# Patient Record
Sex: FEMALE | Race: WHITE | NOT HISPANIC OR LATINO | Employment: UNEMPLOYED | URBAN - NONMETROPOLITAN AREA
[De-identification: names, ages, dates, MRNs, and addresses within clinical notes are randomized per-mention and may not be internally consistent; named-entity substitution may affect disease eponyms.]

---
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
## 2022-11-29 ENCOUNTER — HOSPITAL ENCOUNTER (EMERGENCY)
Facility: HOSPITAL | Age: 22
Discharge: HOME | End: 2022-11-29
Attending: STUDENT IN AN ORGANIZED HEALTH CARE EDUCATION/TRAINING PROGRAM | Admitting: STUDENT IN AN ORGANIZED HEALTH CARE EDUCATION/TRAINING PROGRAM

## 2022-11-29 VITALS
DIASTOLIC BLOOD PRESSURE: 78 MMHG | OXYGEN SATURATION: 99 % | BODY MASS INDEX: 35.88 KG/M2 | RESPIRATION RATE: 16 BRPM | SYSTOLIC BLOOD PRESSURE: 124 MMHG | WEIGHT: 178 LBS | HEART RATE: 88 BPM | HEIGHT: 59 IN | TEMPERATURE: 98.7 F

## 2022-11-29 DIAGNOSIS — J10.1 INFLUENZA A: Primary | ICD-10-CM

## 2022-11-29 LAB
FLUAV SUBTYP SPEC NAA+PROBE: DETECTED
FLUBV RNA ISLT QL NAA+PROBE: NOT DETECTED
SARS-COV-2 RNA PNL SPEC NAA+PROBE: NOT DETECTED

## 2022-11-29 PROCEDURE — 87636 SARSCOV2 & INF A&B AMP PRB: CPT | Performed by: PHYSICIAN ASSISTANT

## 2022-11-29 PROCEDURE — 99283 EMERGENCY DEPT VISIT LOW MDM: CPT

## 2022-11-29 PROCEDURE — C9803 HOPD COVID-19 SPEC COLLECT: HCPCS

## 2022-11-29 RX ORDER — FEXOFENADINE HCL AND PSEUDOEPHEDRINE HCI 180; 240 MG/1; MG/1
1 TABLET, EXTENDED RELEASE ORAL DAILY
Qty: 30 TABLET | Refills: 0 | Status: SHIPPED | OUTPATIENT
Start: 2022-11-29

## 2022-12-05 NOTE — ED PROVIDER NOTES
Subjective   History of Present Illness  Patient has bilateral ear pain and sore throat cough and congestion.  Patient also has a headache and myalgias.  No n/v/d.    History provided by:  Patient   used: No    Flu Symptoms  Presenting symptoms: myalgias    Presenting symptoms: no cough, no diarrhea, no fever, no headaches, no nausea, no shortness of breath, no sore throat and no vomiting    Severity:  Mild  Onset quality:  Sudden  Progression:  Worsening  Chronicity:  New  Relieved by:  Nothing  Worsened by:  Nothing  Ineffective treatments:  None tried  Associated symptoms: ear pain    Associated symptoms: no chills and no congestion    Risk factors: sick contacts        Review of Systems   Constitutional: Positive for activity change. Negative for chills and fever.   HENT: Positive for ear pain. Negative for congestion and sore throat.    Respiratory: Negative for cough, shortness of breath and wheezing.    Cardiovascular: Negative for chest pain.   Gastrointestinal: Negative for diarrhea, nausea and vomiting.   Genitourinary: Negative for dysuria and flank pain.   Musculoskeletal: Positive for myalgias.   Skin: Negative for rash.   Neurological: Negative for headaches.   Psychiatric/Behavioral: The patient is not nervous/anxious.    All other systems reviewed and are negative.      Past Medical History:   Diagnosis Date   • ADHD (attention deficit hyperactivity disorder)    • Anxiety    • Urinary tract infection        Allergies   Allergen Reactions   • Biaxin [Clarithromycin] Rash       Past Surgical History:   Procedure Laterality Date   • CHOLECYSTECTOMY N/A 2/20/2020    Procedure: CHOLECYSTECTOMY LAPAROSCOPIC;  Surgeon: Leif Pinedo MD;  [facility];  Service: General;  Laterality: N/A;       Family History   Problem Relation Age of Onset   • Stroke Paternal Grandfather        Social History     Socioeconomic History   • Marital status: Single   Tobacco Use   • Smoking status:  Never   • Smokeless tobacco: Never   Substance and Sexual Activity   • Alcohol use: Never   • Drug use: Never   • Sexual activity: Yes     Partners: Male           Objective   Physical Exam  Vitals and nursing note reviewed.   Constitutional:       Appearance: She is well-developed.   HENT:      Head: Normocephalic.   Cardiovascular:      Rate and Rhythm: Normal rate and regular rhythm.   Pulmonary:      Effort: Pulmonary effort is normal.      Breath sounds: Normal breath sounds.   Abdominal:      General: Bowel sounds are normal.      Palpations: Abdomen is soft.      Tenderness: There is no abdominal tenderness.   Musculoskeletal:         General: Normal range of motion.      Cervical back: Neck supple.   Skin:     General: Skin is warm and dry.   Neurological:      Mental Status: She is alert and oriented to person, place, and time.   Psychiatric:         Behavior: Behavior normal.         Thought Content: Thought content normal.         Judgment: Judgment normal.         Procedures           ED Course                                           MDM    Final diagnoses:   Influenza A       ED Disposition  ED Disposition     ED Disposition   Discharge    Condition   Stable    Comment   --             Tonny Dunne MD  [address] [place] [address] [address] [place] [address] [phone]    In 2 days           Medication List      New Prescriptions    fexofenadine-pseudoephedrine 180-240 MG per 24 hr tablet  Commonly known as: Allegra-D Allergy & Congestion  Take 1 tablet by mouth Daily.        Stop    ibuprofen 800 MG tablet  Commonly known as: ADVIL,MOTRIN     lactulose 10 GM/15ML solution  Commonly known as: CHRONULAC           Where to Get Your Medications      These medications were sent to [id number] - [place] - [address] AT [facility] [address] - [phone] PH - [phone] FX  [address] [place] [address]    Phone: [phone]    · fexofenadine-pseudoephedrine 180-240 MG per 24 hr tablet          Mary Jane Gee PA  12/07/22 1010

## 2023-01-24 ENCOUNTER — APPOINTMENT (OUTPATIENT)
Dept: GENERAL RADIOLOGY | Facility: HOSPITAL | Age: 23
End: 2023-01-24
Payer: COMMERCIAL

## 2023-01-24 ENCOUNTER — HOSPITAL ENCOUNTER (EMERGENCY)
Facility: HOSPITAL | Age: 23
Discharge: HOME | End: 2023-01-24
Attending: STUDENT IN AN ORGANIZED HEALTH CARE EDUCATION/TRAINING PROGRAM | Admitting: STUDENT IN AN ORGANIZED HEALTH CARE EDUCATION/TRAINING PROGRAM
Payer: COMMERCIAL

## 2023-01-24 VITALS
WEIGHT: 166 LBS | HEIGHT: 60 IN | HEART RATE: 80 BPM | OXYGEN SATURATION: 99 % | DIASTOLIC BLOOD PRESSURE: 78 MMHG | BODY MASS INDEX: 32.59 KG/M2 | RESPIRATION RATE: 20 BRPM | SYSTOLIC BLOOD PRESSURE: 109 MMHG | TEMPERATURE: 97.8 F

## 2023-01-24 DIAGNOSIS — S39.012A STRAIN OF LUMBAR REGION, INITIAL ENCOUNTER: Primary | ICD-10-CM

## 2023-01-24 PROCEDURE — 25010000002 DEXAMETHASONE PER 1 MG: Performed by: STUDENT IN AN ORGANIZED HEALTH CARE EDUCATION/TRAINING PROGRAM

## 2023-01-24 PROCEDURE — 96372 THER/PROPH/DIAG INJ SC/IM: CPT

## 2023-01-24 PROCEDURE — 99283 EMERGENCY DEPT VISIT LOW MDM: CPT

## 2023-01-24 PROCEDURE — 72110 X-RAY EXAM L-2 SPINE 4/>VWS: CPT

## 2023-01-24 PROCEDURE — 25010000002 KETOROLAC TROMETHAMINE PER 15 MG: Performed by: STUDENT IN AN ORGANIZED HEALTH CARE EDUCATION/TRAINING PROGRAM

## 2023-01-24 RX ORDER — DEXAMETHASONE SODIUM PHOSPHATE 4 MG/ML
4 INJECTION, SOLUTION INTRA-ARTICULAR; INTRALESIONAL; INTRAMUSCULAR; INTRAVENOUS; SOFT TISSUE ONCE
Status: COMPLETED | OUTPATIENT
Start: 2023-01-24 | End: 2023-01-24

## 2023-01-24 RX ORDER — KETOROLAC TROMETHAMINE 30 MG/ML
30 INJECTION, SOLUTION INTRAMUSCULAR; INTRAVENOUS ONCE
Status: COMPLETED | OUTPATIENT
Start: 2023-01-24 | End: 2023-01-24

## 2023-01-24 RX ADMIN — DEXAMETHASONE SODIUM PHOSPHATE 4 MG: 4 INJECTION, SOLUTION INTRA-ARTICULAR; INTRALESIONAL; INTRAMUSCULAR; INTRAVENOUS; SOFT TISSUE at 04:00

## 2023-01-24 RX ADMIN — KETOROLAC TROMETHAMINE 30 MG: 30 INJECTION, SOLUTION INTRAMUSCULAR; INTRAVENOUS at 04:03

## 2023-01-24 NOTE — ED PROVIDER NOTES
Subjective   History of Present Illness  22-year-old female presents the ER with primary complaint of lumbar back pain.  Symptoms present for the last 1 to 2 days.  Patient notes she had possibly strained her back while cleaning.  Denied bowel or bladder incontinence.  Denied saddle anesthesia.  Lower back pain appears to radiate into the left lower back region.  No changes in bowel or urinary habits.  Patient does have a history of constipation.  No increasing nausea or vomiting.  No fever or chills.  No hematuria or dysuria.  Vitals stable        Review of Systems   Musculoskeletal: Positive for back pain.   All other systems reviewed and are negative.      Past Medical History:   Diagnosis Date   • ADHD (attention deficit hyperactivity disorder)    • Anxiety    • Urinary tract infection        Allergies   Allergen Reactions   • Biaxin [Clarithromycin] Rash       Past Surgical History:   Procedure Laterality Date   • CHOLECYSTECTOMY N/A 2/20/2020    Procedure: CHOLECYSTECTOMY LAPAROSCOPIC;  Surgeon: Leif Pinedo MD;  Location: [facility];  Service: General;  Laterality: N/A;       Family History   Problem Relation Age of Onset   • Stroke Paternal Grandfather        Social History     Socioeconomic History   • Marital status: Single   Tobacco Use   • Smoking status: Never   • Smokeless tobacco: Never   Substance and Sexual Activity   • Alcohol use: Never   • Drug use: Never   • Sexual activity: Yes     Partners: Male           Objective   Physical Exam  Constitutional:       General: She is not in acute distress.     Appearance: Normal appearance. She is not ill-appearing.   HENT:      Head: Normocephalic and atraumatic.      Right Ear: External ear normal.      Left Ear: External ear normal.      Nose: Nose normal.      Mouth/Throat:      Mouth: Mucous membranes are moist.   Eyes:      Extraocular Movements: Extraocular movements intact.      Pupils: Pupils are equal, round, and reactive to light.    Cardiovascular:      Rate and Rhythm: Normal rate and regular rhythm.      Heart sounds: No murmur heard.  Pulmonary:      Effort: Pulmonary effort is normal. No respiratory distress.      Breath sounds: Normal breath sounds. No wheezing.   Abdominal:      General: Bowel sounds are normal.      Palpations: Abdomen is soft.      Tenderness: There is no abdominal tenderness.   Musculoskeletal:         General: No deformity or signs of injury. Normal range of motion.      Cervical back: Normal range of motion and neck supple.   Skin:     General: Skin is warm and dry.      Findings: No erythema.   Neurological:      General: No focal deficit present.      Mental Status: She is alert and oriented to person, place, and time. Mental status is at baseline.      Cranial Nerves: No cranial nerve deficit.   Psychiatric:         Mood and Affect: Mood normal.         Behavior: Behavior normal.         Thought Content: Thought content normal.         Procedures           ED Course                                           Medical Decision Making  Lumbar imaging noted no acute abnormality.  Lumbar strain likely.  Toradol and Decadron given for symptom management.  No neurological red flags.  Work up and results were discussed throughly with the patient.  The patient will be discharged for further monitoring and management with their PCP.  Red flags, warning signs, worsening symptoms, and when to return to the ER discussed with and understood by the patient.  Patient will follow up with their PCP in a timely manner.  Vitals stable at discharge.    Strain of lumbar region, initial encounter: complicated acute illness or injury  Amount and/or Complexity of Data Reviewed  Radiology: ordered.      Risk  Prescription drug management.          Final diagnoses:   Strain of lumbar region, initial encounter       ED Disposition  ED Disposition     ED Disposition   Discharge    Condition   Stable    Comment   --             Vibha  Tonny Saunders MD  [address] [place] [address] [address] [place] [address]  [phone]    In 1 week      [facility] Emergency Department  [address] [place] [id number] [phone]    If symptoms worsen         Medication List      No changes were made to your prescriptions during this visit.          Alex Ramirez,   01/24/23 0350

## 2023-03-14 ENCOUNTER — HOSPITAL ENCOUNTER (EMERGENCY)
Facility: HOSPITAL | Age: 23
Discharge: HOME | End: 2023-03-14
Attending: STUDENT IN AN ORGANIZED HEALTH CARE EDUCATION/TRAINING PROGRAM | Admitting: STUDENT IN AN ORGANIZED HEALTH CARE EDUCATION/TRAINING PROGRAM
Payer: COMMERCIAL

## 2023-03-14 VITALS
OXYGEN SATURATION: 98 % | HEART RATE: 96 BPM | RESPIRATION RATE: 18 BRPM | HEIGHT: 60 IN | TEMPERATURE: 97.5 F | BODY MASS INDEX: 33.38 KG/M2 | DIASTOLIC BLOOD PRESSURE: 62 MMHG | SYSTOLIC BLOOD PRESSURE: 124 MMHG | WEIGHT: 170 LBS

## 2023-03-14 DIAGNOSIS — A08.4 VIRAL GASTROENTERITIS: Primary | ICD-10-CM

## 2023-03-14 LAB
ALBUMIN SERPL-MCNC: 4.2 G/DL (ref 3.5–5.2)
ALBUMIN/GLOB SERPL: 1.2 G/DL
ALP SERPL-CCNC: 74 U/L (ref 39–117)
ALT SERPL W P-5'-P-CCNC: 8 U/L (ref 1–33)
ANION GAP SERPL CALCULATED.3IONS-SCNC: 10.8 MMOL/L (ref 5–15)
AST SERPL-CCNC: 16 U/L (ref 1–32)
BACTERIA UR QL AUTO: ABNORMAL /HPF
BASOPHILS # BLD AUTO: 0.01 10*3/MM3 (ref 0–0.2)
BASOPHILS NFR BLD AUTO: 0.2 % (ref 0–1.5)
BILIRUB SERPL-MCNC: 0.5 MG/DL (ref 0–1.2)
BILIRUB UR QL STRIP: ABNORMAL
BUN SERPL-MCNC: 7 MG/DL (ref 6–20)
BUN/CREAT SERPL: 10.4 (ref 7–25)
CALCIUM SPEC-SCNC: 9.6 MG/DL (ref 8.6–10.5)
CHLORIDE SERPL-SCNC: 105 MMOL/L (ref 98–107)
CLARITY UR: CLEAR
CO2 SERPL-SCNC: 25.2 MMOL/L (ref 22–29)
COLOR UR: ABNORMAL
CREAT SERPL-MCNC: 0.67 MG/DL (ref 0.57–1)
CRP SERPL-MCNC: 1.98 MG/DL (ref 0–0.5)
DEPRECATED RDW RBC AUTO: 45.7 FL (ref 37–54)
EGFRCR SERPLBLD CKD-EPI 2021: 127.7 ML/MIN/1.73
EOSINOPHIL # BLD AUTO: 0.05 10*3/MM3 (ref 0–0.4)
EOSINOPHIL NFR BLD AUTO: 1.2 % (ref 0.3–6.2)
ERYTHROCYTE [DISTWIDTH] IN BLOOD BY AUTOMATED COUNT: 13.7 % (ref 12.3–15.4)
ERYTHROCYTE [SEDIMENTATION RATE] IN BLOOD: 25 MM/HR (ref 0–20)
GLOBULIN UR ELPH-MCNC: 3.6 GM/DL
GLUCOSE SERPL-MCNC: 98 MG/DL (ref 65–99)
GLUCOSE UR STRIP-MCNC: NEGATIVE MG/DL
HCG SERPL QL: NEGATIVE
HCT VFR BLD AUTO: 43.5 % (ref 34–46.6)
HGB BLD-MCNC: 14 G/DL (ref 12–15.9)
HGB UR QL STRIP.AUTO: ABNORMAL
HOLD SPECIMEN: NORMAL
HYALINE CASTS UR QL AUTO: ABNORMAL /LPF
IMM GRANULOCYTES # BLD AUTO: 0.01 10*3/MM3 (ref 0–0.05)
IMM GRANULOCYTES NFR BLD AUTO: 0.2 % (ref 0–0.5)
KETONES UR QL STRIP: ABNORMAL
LEUKOCYTE ESTERASE UR QL STRIP.AUTO: ABNORMAL
LIPASE SERPL-CCNC: 15 U/L (ref 13–60)
LYMPHOCYTES # BLD AUTO: 1.03 10*3/MM3 (ref 0.7–3.1)
LYMPHOCYTES NFR BLD AUTO: 24 % (ref 19.6–45.3)
MCH RBC QN AUTO: 29.1 PG (ref 26.6–33)
MCHC RBC AUTO-ENTMCNC: 32.2 G/DL (ref 31.5–35.7)
MCV RBC AUTO: 90.4 FL (ref 79–97)
MONOCYTES # BLD AUTO: 0.33 10*3/MM3 (ref 0.1–0.9)
MONOCYTES NFR BLD AUTO: 7.7 % (ref 5–12)
NEUTROPHILS NFR BLD AUTO: 2.86 10*3/MM3 (ref 1.7–7)
NEUTROPHILS NFR BLD AUTO: 66.7 % (ref 42.7–76)
NITRITE UR QL STRIP: NEGATIVE
NRBC BLD AUTO-RTO: 0 /100 WBC (ref 0–0.2)
PH UR STRIP.AUTO: 5.5 [PH] (ref 5–8)
PLATELET # BLD AUTO: 323 10*3/MM3 (ref 140–450)
PMV BLD AUTO: 9.9 FL (ref 6–12)
POTASSIUM SERPL-SCNC: 3.1 MMOL/L (ref 3.5–5.2)
PROT SERPL-MCNC: 7.8 G/DL (ref 6–8.5)
PROT UR QL STRIP: ABNORMAL
RBC # BLD AUTO: 4.81 10*6/MM3 (ref 3.77–5.28)
RBC # UR STRIP: ABNORMAL /HPF
REF LAB TEST METHOD: ABNORMAL
SODIUM SERPL-SCNC: 141 MMOL/L (ref 136–145)
SP GR UR STRIP: >1.03 (ref 1–1.03)
SQUAMOUS #/AREA URNS HPF: ABNORMAL /HPF
UROBILINOGEN UR QL STRIP: ABNORMAL
WBC # UR STRIP: ABNORMAL /HPF
WBC NRBC COR # BLD: 4.29 10*3/MM3 (ref 3.4–10.8)
WHOLE BLOOD HOLD COAG: NORMAL
WHOLE BLOOD HOLD SPECIMEN: NORMAL

## 2023-03-14 PROCEDURE — 84703 CHORIONIC GONADOTROPIN ASSAY: CPT | Performed by: PHYSICIAN ASSISTANT

## 2023-03-14 PROCEDURE — 99283 EMERGENCY DEPT VISIT LOW MDM: CPT

## 2023-03-14 PROCEDURE — 85652 RBC SED RATE AUTOMATED: CPT | Performed by: PHYSICIAN ASSISTANT

## 2023-03-14 PROCEDURE — 83690 ASSAY OF LIPASE: CPT | Performed by: PHYSICIAN ASSISTANT

## 2023-03-14 PROCEDURE — 87086 URINE CULTURE/COLONY COUNT: CPT | Performed by: PHYSICIAN ASSISTANT

## 2023-03-14 PROCEDURE — 85025 COMPLETE CBC W/AUTO DIFF WBC: CPT | Performed by: PHYSICIAN ASSISTANT

## 2023-03-14 PROCEDURE — 81001 URINALYSIS AUTO W/SCOPE: CPT | Performed by: PHYSICIAN ASSISTANT

## 2023-03-14 PROCEDURE — 36415 COLL VENOUS BLD VENIPUNCTURE: CPT

## 2023-03-14 PROCEDURE — 86140 C-REACTIVE PROTEIN: CPT | Performed by: PHYSICIAN ASSISTANT

## 2023-03-14 PROCEDURE — 80053 COMPREHEN METABOLIC PANEL: CPT | Performed by: PHYSICIAN ASSISTANT

## 2023-03-14 RX ORDER — ONDANSETRON 4 MG/1
4 TABLET, FILM COATED ORAL EVERY 6 HOURS
Qty: 12 TABLET | Refills: 0 | Status: SHIPPED | OUTPATIENT
Start: 2023-03-14 | End: 2023-03-14 | Stop reason: SDUPTHER

## 2023-03-14 RX ORDER — LOPERAMIDE HYDROCHLORIDE 2 MG/1
2 CAPSULE ORAL ONCE AS NEEDED
Status: COMPLETED | OUTPATIENT
Start: 2023-03-14 | End: 2023-03-14

## 2023-03-14 RX ORDER — ONDANSETRON 4 MG/1
4 TABLET, FILM COATED ORAL EVERY 8 HOURS PRN
Qty: 12 TABLET | Refills: 0 | Status: SHIPPED | OUTPATIENT
Start: 2023-03-14

## 2023-03-14 RX ADMIN — LOPERAMIDE HYDROCHLORIDE 2 MG: 2 CAPSULE ORAL at 22:11

## 2023-03-14 NOTE — ED NOTES
MEDICAL SCREENING:    Reason for Visit: Nausea, vomiting, diarrhea.  Started yesterday.  Daughter being seen for the same  Patient initially seen in triage.  The patient was advised further evaluation and diagnostic testing will be needed, some of the treatment and testing will be initiated in the lobby in order to begin the process.  The patient will be returned to the waiting area for the time being and possibly be re-assessed by a subsequent ED provider.  The patient will be brought back to the treatment area in as timely manner as possible.         Renny Corona PA  03/14/23 1801

## 2023-03-15 LAB — BACTERIA SPEC AEROBE CULT: NO GROWTH

## 2023-03-15 NOTE — DISCHARGE INSTRUCTIONS
Dr. Sasha Macdonald at bedside speaking to patient and family regarding TPA.       Yuriy Elder RN  02/20/21 2987 Zofran prescribed for nausea.  May take loperamide dose after next stool.  Expect symptoms will improve in the next few days, follow-up with your primary care provider in the next available appointment.

## 2023-03-15 NOTE — ED PROVIDER NOTES
[facility]  emergency department encounter        Pt Name: Edita Tellez  MRN: [id number]  Birthdate 2000  Date of evaluation: 3/14/2023    Chief Complaint   Patient presents with   • Diarrhea   • Vomiting             HISTORY OF PRESENT ILLNESS:   Edita Tellez is a 23 y.o. female previously healthy presents for nausea vomiting diarrhea new onset yesterday.  Daughter has similar symptoms preceding hers.  Patient has taken Imodium without significant relief.  Has not had nausea medications at home.  By time of physician interview patient denies active nausea and last 2 was prior to arrival.  Endorses feeling alternating hot and cold but no known fever.  Broadly denies symptoms otherwise including abdominal pain.            PCP: Tonny Dunne MD          REVIEW OF SYSTEMS:     Review of Systems   Constitutional: Positive for chills. Negative for fever.   HENT: Negative for congestion and rhinorrhea.    Eyes: Negative for visual disturbance.   Respiratory: Negative for cough and shortness of breath.    Cardiovascular: Negative for chest pain.   Gastrointestinal: Positive for diarrhea, nausea and vomiting. Negative for abdominal pain.   Genitourinary: Negative for dysuria.   Musculoskeletal: Negative for myalgias.   Skin: Negative for rash.   Neurological: Negative for headaches.   Psychiatric/Behavioral: Negative for confusion.       Review of systems otherwise as per HPI.          PREVIOUS HISTORY:         Past Medical History:   Diagnosis Date   • ADHD (attention deficit hyperactivity disorder)    • Anxiety    • Urinary tract infection            Past Surgical History:   Procedure Laterality Date   • CHOLECYSTECTOMY N/A 2/20/2020    Procedure: CHOLECYSTECTOMY LAPAROSCOPIC;  Surgeon: Leif Pinedo MD;  Location: [facility];  Service: General;  Laterality: N/A;           Social History     Socioeconomic History   • Marital status: Single   Tobacco Use   • Smoking status: Never   •  "Smokeless tobacco: Never   Substance and Sexual Activity   • Alcohol use: Never   • Drug use: Never   • Sexual activity: Yes     Partners: Male           Family History   Problem Relation Age of Onset   • Stroke Paternal Grandfather          Current Outpatient Medications   Medication Instructions   • fexofenadine-pseudoephedrine (Allegra-D Allergy & Congestion) 180-240 MG per 24 hr tablet 1 tablet, Oral, Daily   • ondansetron (ZOFRAN) 4 mg, Oral, Every 8 Hours PRN   • prenatal vitamin (prenatal, CLASSIC, vitamin) tablet 1 tablet, Oral, Daily         Allergies:  Biaxin [clarithromycin]          PHYSICAL EXAM:     Patient Vitals for the past 24 hrs:   BP Temp Pulse Resp SpO2 Height Weight   03/14/23 1802 121/52 97.3 °F (36.3 °C) 95 18 97 % 152.4 cm (60\") 77.1 kg (170 lb)       Physical Exam  Vitals and nursing note reviewed.   Constitutional:       General: She is not in acute distress.  HENT:      Head: Normocephalic and atraumatic.   Eyes:      Extraocular Movements: Extraocular movements intact.      Conjunctiva/sclera: Conjunctivae normal.   Pulmonary:      Effort: Pulmonary effort is normal. No respiratory distress.   Abdominal:      General: Abdomen is flat. There is no distension.   Musculoskeletal:         General: No deformity. Normal range of motion.      Cervical back: Normal range of motion. No rigidity.   Skin:     Coloration: Skin is not jaundiced.      Findings: No rash.   Neurological:      General: No focal deficit present.      Mental Status: She is alert and oriented to person, place, and time.   Psychiatric:         Mood and Affect: Mood normal.         Behavior: Behavior normal.             COMPLETED DIAGNOSTIC STUDIES AND INTERVENTIONS:     Lab Results (last 24 hours)     Procedure Component Value Units Date/Time    CBC & Differential [[id number]]  (Normal) Collected: 03/14/23 1913    Specimen: Blood from Arm, Right Updated: 03/14/23 1918    Narrative:      The following orders were created for " panel order CBC & Differential.  Procedure                               Abnormality         Status                     ---------                               -----------         ------                     CBC Auto Differential[[id number]]        Normal              Final result                 Please view results for these tests on the individual orders.    Comprehensive Metabolic Panel [[id number]]  (Abnormal) Collected: 03/14/23 1913    Specimen: Blood from Arm, Right Updated: 03/14/23 1938     Glucose 98 mg/dL      BUN 7 mg/dL      Creatinine 0.67 mg/dL      Sodium 141 mmol/L      Potassium 3.1 mmol/L      Chloride 105 mmol/L      CO2 25.2 mmol/L      Calcium 9.6 mg/dL      Total Protein 7.8 g/dL      Albumin 4.2 g/dL      ALT (SGPT) 8 U/L      AST (SGOT) 16 U/L      Alkaline Phosphatase 74 U/L      Total Bilirubin 0.5 mg/dL      Globulin 3.6 gm/dL      A/G Ratio 1.2 g/dL      BUN/Creatinine Ratio 10.4     Anion Gap 10.8 mmol/L      eGFR 127.7 mL/min/1.73     Narrative:      GFR Normal >60  Chronic Kidney Disease <60  Kidney Failure <15      Lipase [[id number]]  (Normal) Collected: 03/14/23 1913    Specimen: Blood from Arm, Right Updated: 03/14/23 1938     Lipase 15 U/L     hCG, Serum, Qualitative [[id number]]  (Normal) Collected: 03/14/23 1913    Specimen: Blood from Arm, Right Updated: 03/14/23 1932     HCG Qualitative Negative    C-reactive Protein [[id number]]  (Abnormal) Collected: 03/14/23 1913    Specimen: Blood from Arm, Right Updated: 03/14/23 1938     C-Reactive Protein 1.98 mg/dL     Sedimentation Rate [[id number]]  (Abnormal) Collected: 03/14/23 1913    Specimen: Blood from Arm, Right Updated: 03/14/23 1920     Sed Rate 25 mm/hr     CBC Auto Differential [[id number]]  (Normal) Collected: 03/14/23 1913    Specimen: Blood from Arm, Right Updated: 03/14/23 1918     WBC 4.29 10*3/mm3      RBC 4.81 10*6/mm3      Hemoglobin 14.0 g/dL      Hematocrit 43.5 %      MCV 90.4 fL      MCH 29.1 pg      MCHC 32.2 g/dL       RDW 13.7 %      RDW-SD 45.7 fl      MPV 9.9 fL      Platelets 323 10*3/mm3      Neutrophil % 66.7 %      Lymphocyte % 24.0 %      Monocyte % 7.7 %      Eosinophil % 1.2 %      Basophil % 0.2 %      Immature Grans % 0.2 %      Neutrophils, Absolute 2.86 10*3/mm3      Lymphocytes, Absolute 1.03 10*3/mm3      Monocytes, Absolute 0.33 10*3/mm3      Eosinophils, Absolute 0.05 10*3/mm3      Basophils, Absolute 0.01 10*3/mm3      Immature Grans, Absolute 0.01 10*3/mm3      nRBC 0.0 /100 WBC     Urinalysis With Culture If Indicated - Urine, Clean Catch [[id number]]  (Abnormal) Collected: 03/14/23 2130    Specimen: Urine, Clean Catch Updated: 03/14/23 2141     Color, UA Dark Yellow     Appearance, UA Clear     pH, UA 5.5     Specific Gravity, UA >1.030     Glucose, UA Negative     Ketones, UA Trace     Bilirubin, UA Small (1+)     Blood, UA Large (3+)     Protein, UA 30 mg/dL (1+)     Leuk Esterase, UA Trace     Nitrite, UA Negative     Urobilinogen, UA 1.0 E.U./dL    Narrative:      In absence of clinical symptoms, the presence of pyuria, bacteria, and/or nitrites on the urinalysis result does not correlate with infection.    Urinalysis, Microscopic Only - Urine, Clean Catch [[id number]]  (Abnormal) Collected: 03/14/23 2130    Specimen: Urine, Clean Catch Updated: 03/14/23 2141     RBC, UA 3-5 /HPF      WBC, UA 6-12 /HPF      Bacteria, UA None Seen /HPF      Squamous Epithelial Cells, UA 3-6 /HPF      Hyaline Casts, UA 13-20 /LPF      Methodology Automated Microscopy    Urine Culture - Urine, Urine, Clean Catch [[id number]] Collected: 03/14/23 2130    Specimen: Urine, Clean Catch Updated: 03/14/23 2141            No orders to display         New Medications Ordered This Visit   Medications   • loperamide (IMODIUM) capsule 2 mg     TO GO   • ondansetron (ZOFRAN) 4 MG tablet     Sig: Take 1 tablet by mouth Every 8 (Eight) Hours As Needed for Nausea or Vomiting for up to 12 doses.     Dispense:  12 tablet     Refill:  0          Procedures            MEDICAL DECISION-MAKING AND ED COURSE:     ED Course as of 03/14/23 2208   Tue Mar 14, 2023   2207 MDM: 23 female with nausea vomiting and diarrhea consistent with gastroenteritis, likely viral as patient's family had similar syndrome.  Well-appearing by time of exam, no active nausea.  Will provide loperamide to go for patient's neck stool, Zofran prescribed.  Patient to follow-up with primary care provider as needed or return here for new onset or worsening symptoms. [KP]      ED Course User Index  [KP] Luca Marley MD       ?      FINAL IMPRESSION:       1. Viral gastroenteritis         The complaints listed here are new problems to this examiner.       Medication List      New Prescriptions    ondansetron 4 MG tablet  Commonly known as: ZOFRAN  Take 1 tablet by mouth Every 8 (Eight) Hours As Needed for Nausea or Vomiting for up to 12 doses.           Where to Get Your Medications      These medications were sent to [id number] - [place], [place] - [address] AT [facility] [address] - [phone]  - [phone] FX  [address] [place] [address]    Phone: [phone]   · ondansetron 4 MG tablet         FOLLOW-UP  Tonny Dunne MD  [address] [place] [address] [address] [place] [address] [phone]    Schedule an appointment as soon as possible for a visit       [facility] Emergency Department  [address] [place] [id number] [phone]    If symptoms worsen      DISPOSITION  ED Disposition     ED Disposition   Discharge    Condition   Stable    Comment   --                 Please note that portions of this note were completed with a voice recognition program.      Luca Marley MD  22:08 EDT  3/14/2023             Luca Marley MD  03/14/23 2208

## (undated) DEVICE — GLV SURG PREMIERPRO MIC LTX PF SZ7.5 BRN

## (undated) DEVICE — PAD GRND REM POLYHESIVE A/ DISP

## (undated) DEVICE — ENDOPATH ELECTROSURGERY PROBE PLUS II 5MM RIGHT ANGLE MONOPOLAR ELECTROSURGERY SUCTION AND IRRRIGATION SHAFTS WITH RIGHT ANGLE ELECTRODE - USE ONLY WITH PROBE PLUS II HANDLES: Brand: ENDOPATH

## (undated) DEVICE — APPL CHLORAPREP W/TINT 26ML ORNG

## (undated) DEVICE — HOLDER: Brand: DEROYAL

## (undated) DEVICE — DRAPE,UTILTY,TAPE,15X26, 4EA/PK: Brand: MEDLINE

## (undated) DEVICE — CYSTO/BLADDER IRRIGATION SET, REGULATING CLAMP

## (undated) DEVICE — [HIGH FLOW INSUFFLATOR,  DO NOT USE IF PACKAGE IS DAMAGED,  KEEP DRY,  KEEP AWAY FROM SUNLIGHT,  PROTECT FROM HEAT AND RADIOACTIVE SOURCES.]: Brand: PNEUMOSURE

## (undated) DEVICE — TROCAR: Brand: KII® SLEEVE

## (undated) DEVICE — ENDOPATH ELECTROSURGERY PROBE PLUS II PISTOL HAND CONTROL PISTOL GRIP HANDLES WITH SUCTION AND IRRRIGATION FOR HAND CONTROL MONOPOLAR ELCTROSURGERY USE ONLY WITH PROBE PLUS II SHAFTS: Brand: ENDOPATH

## (undated) DEVICE — COR LAP CHOLE: Brand: MEDLINE INDUSTRIES, INC.

## (undated) DEVICE — TROCAR: Brand: KII FIOS FIRST ENTRY

## (undated) DEVICE — SUT VIC 2/0 UR6 27IN J602H

## (undated) DEVICE — ENDOCUT SCISSOR TIP, DISPOSABLE: Brand: RENEW

## (undated) DEVICE — ANTIBACTERIAL UNDYED BRAIDED (POLYGLACTIN 910), SYNTHETIC ABSORBABLE SUTURE: Brand: COATED VICRYL

## (undated) DEVICE — ENDOPATH XCEL BLADELESS TROCARS WITH STABILITY SLEEVES: Brand: ENDOPATH XCEL